# Patient Record
Sex: FEMALE | Race: OTHER | URBAN - METROPOLITAN AREA
[De-identification: names, ages, dates, MRNs, and addresses within clinical notes are randomized per-mention and may not be internally consistent; named-entity substitution may affect disease eponyms.]

---

## 2017-05-16 ENCOUNTER — EMERGENCY (EMERGENCY)
Facility: HOSPITAL | Age: 37
LOS: 0 days | Discharge: HOME | End: 2017-05-16
Attending: EMERGENCY MEDICINE | Admitting: EMERGENCY MEDICINE
Payer: MEDICAID

## 2017-05-16 VITALS
RESPIRATION RATE: 18 BRPM | HEART RATE: 94 BPM | TEMPERATURE: 98 F | OXYGEN SATURATION: 100 % | HEIGHT: 62 IN | DIASTOLIC BLOOD PRESSURE: 69 MMHG | WEIGHT: 145.06 LBS | SYSTOLIC BLOOD PRESSURE: 123 MMHG

## 2017-05-16 VITALS — WEIGHT: 190.04 LBS | HEIGHT: 63 IN

## 2017-05-16 DIAGNOSIS — O26.891 OTHER SPECIFIED PREGNANCY RELATED CONDITIONS, FIRST TRIMESTER: ICD-10-CM

## 2017-05-16 DIAGNOSIS — Z3A.12 12 WEEKS GESTATION OF PREGNANCY: ICD-10-CM

## 2017-05-16 DIAGNOSIS — M67.432 GANGLION, LEFT WRIST: ICD-10-CM

## 2017-05-16 PROCEDURE — 99283 EMERGENCY DEPT VISIT LOW MDM: CPT

## 2017-05-16 PROCEDURE — 29125 APPL SHORT ARM SPLINT STATIC: CPT | Mod: LT

## 2017-05-16 PROCEDURE — 73110 X-RAY EXAM OF WRIST: CPT | Mod: 26,LT

## 2017-05-16 RX ORDER — AMPICILLIN SODIUM AND SULBACTAM SODIUM 250; 125 MG/ML; MG/ML
INJECTION, POWDER, FOR SUSPENSION INTRAMUSCULAR; INTRAVENOUS
Refills: 0 | Status: DISCONTINUED | OUTPATIENT
Start: 2017-05-16 | End: 2017-05-16

## 2017-05-16 RX ORDER — SODIUM CHLORIDE 9 MG/ML
1000 INJECTION INTRAMUSCULAR; INTRAVENOUS; SUBCUTANEOUS ONCE
Refills: 0 | Status: DISCONTINUED | OUTPATIENT
Start: 2017-05-16 | End: 2017-05-16

## 2017-05-16 RX ORDER — MORPHINE SULFATE 50 MG/1
4 CAPSULE, EXTENDED RELEASE ORAL ONCE
Refills: 0 | Status: DISCONTINUED | OUTPATIENT
Start: 2017-05-16 | End: 2017-05-16

## 2017-05-16 RX ORDER — ACETAMINOPHEN 500 MG
1000 TABLET ORAL ONCE
Refills: 0 | Status: DISCONTINUED | OUTPATIENT
Start: 2017-05-16 | End: 2017-05-16

## 2017-05-16 NOTE — ED STATDOCS - OBJECTIVE STATEMENT
37 y/o F presents to the ED c/o left wrist pain since last night. No recent trauma. Pt is right-handed. Currently pt has no other complaints. Pt is 3 months pregnant. Pt speaks [place]ian, daughter translated at bedside.

## 2017-05-16 NOTE — ED STATDOCS - PROGRESS NOTE DETAILS
Patient seen and evaluated, xray negative, has ganglion cyst, foam wrist splint applied for comfort, will refer to hand and PMD f/u -Thao Reed PA-C

## 2017-05-16 NOTE — ED STATDOCS - ATTENDING CONTRIBUTION TO CARE
I, Carmita Vogel MD,  performed the initial face to face bedside interview with this patient regarding history of present illness, review of symptoms and relevant past medical, social and family history.  I completed an independent physical examination.  I was the initial provider who evaluated this patient. I have signed out the follow up of any pending tests (i.e. labs, radiological studies) to the ACP.  I have communicated the patient’s plan of care and disposition with the ACP.  The history, relevant review of systems, past medical and surgical history, medical decision making, and physical examination was documented by the scribe in my presence and I attest to the accuracy of the documentation.

## 2017-05-16 NOTE — ED ADULT NURSE NOTE - CHIEF COMPLAINT QUOTE
Rt wrist pain/ swelling since last night, denies injury , pt states she is 3-4 months pregnant , sent by Ob/Gyn

## 2017-05-16 NOTE — ED STATDOCS - MUSCULOSKELETAL, MLM
range of motion is not limited. +LUE ganglion cyst dorsum of wrist, distal NVI. range of motion is not limited. +LUE ganglion cyst dorsum of wrist and mildly tender to palpation with no redness, distal NVI.

## 2023-01-18 NOTE — ED STATDOCS - CHPI ED AGGRAVATING FACTORS
Refill Routing Note   Medication(s) are not appropriate for processing by [facility] for the following reason(s):      - Medication not previously prescribed by PCP    [facility] action(s):  Defer Medication-related problems identified: Requires appointment        Medication reconciliation completed: No     Appointments  past 12m or future 3m with PCP    Date Provider   Last Visit   4/12/2022 Angelita Dorado DO   Next Visit   Visit date not found Angelita Dorado DO   ED visits in past 90 days: 0        Note composed:10:37 AM 01/18/2023            nothing

## 2023-07-16 NOTE — ED STATDOCS - MEDICAL DECISION MAKING DETAILS
Problem: At Risk for Falls  Goal: # Patient does not fall  Outcome: Outcome Met, Continue evaluating goal progress toward completion  Intervention: # Implement Fall Risk signage/clothing used by unit  Description: Initiate unit-specific measures to identify patients who are at risk for falls  Flowsheets (Taken 7/16/2023 0233)  # Implement Fall Risk signage/clothing used by unit: Done  Intervention: Risk for Fall Interventions (specify)  Description: Selectively initiate interventions based on patient-specific fall risks. Document in  Associated Rows on Daily Cares.  Flowsheets (Taken 7/16/2023 0233)  Patient's Personal Risk Factors:   Problems with walking or moving   Potential for overestimating ability  Mobility and Gait Measures:   Encourage personal mobility support item use   Use gait belt  Altered Mental Status/Unable to Participate Measures:   Bed/chair exit alert   Monitor for needs frequently   Supervise during toileting   Use low height bed   Hourly or more frequent monitoring  Elimination Risk Interventions: Offer toileting with every interaction (patient able to identify need)  Toileting Schedule:   Q2 hours while awake   Toileting during night as needed  Environmental Safety Measures Maintained: Done  Patient Specific Safety Measures in Use: Safe lighting as appropriate     Problem: Stroke: Ischemic (Transient/Permanent)  Goal: Neurological status is maintained/restored to status at baseline  Description: Monitor neurological and mental status including symptoms of increasing intracranial pressure (headache, nausea/vomiting, or change in behavior). Hypertension (greater than 180 systolic) may also indicate a change in status related to stroke.  Outcome: Outcome Met, Continue evaluating goal progress toward completion  Goal: Elimination status is maintained/returned to baseline  Description: Remove indwelling urinary catheter as soon as possible or collaborate with provider for order/reason for  continued use.   Outcome: Outcome Met, Continue evaluating goal progress toward completion     Problem: Pain  Goal: #Acceptable pain level achieved/maintained at rest using NRS/Faces  Description: This goal is used for patients who can self-report.  Acceptable means the level is at or below the identified comfort/function goal.  Outcome: Outcome Met, Continue evaluating goal progress toward completion      37 yo pt with prob ganglion cyst.  Check x-ray